# Patient Record
Sex: MALE | Race: WHITE | NOT HISPANIC OR LATINO | Employment: OTHER | URBAN - METROPOLITAN AREA
[De-identification: names, ages, dates, MRNs, and addresses within clinical notes are randomized per-mention and may not be internally consistent; named-entity substitution may affect disease eponyms.]

---

## 2023-06-11 ENCOUNTER — CLAIMS CREATED FROM THE CLAIM WINDOW (OUTPATIENT)
Dept: URBAN - METROPOLITAN AREA MEDICAL CENTER 19 | Facility: MEDICAL CENTER | Age: 68
End: 2023-06-11
Payer: MEDICARE

## 2023-06-11 DIAGNOSIS — R93.89 ABNORMAL FINDINGS ON DIAGNOSTIC IMAGING OF OTHER SPECIFIED BODY STRUCTURES: ICD-10-CM

## 2023-06-11 DIAGNOSIS — R10.13 ABDOMINAL DISCOMFORT, EPIGASTRIC: ICD-10-CM

## 2023-06-11 DIAGNOSIS — A31.9 MYCOBACTERIAL INFECTION, UNSPECIFIED: ICD-10-CM

## 2023-06-11 DIAGNOSIS — K29.80 DUODENITIS WITHOUT BLEEDING: ICD-10-CM

## 2023-06-11 DIAGNOSIS — Z79.1 ENCNTR LONG-TERM NSAID USE: ICD-10-CM

## 2023-06-11 DIAGNOSIS — R11.0 AM NAUSEA: ICD-10-CM

## 2023-06-11 DIAGNOSIS — R93.3 ABN FINDINGS-GI TRACT: ICD-10-CM

## 2023-06-11 PROCEDURE — 99222 1ST HOSP IP/OBS MODERATE 55: CPT | Performed by: STUDENT IN AN ORGANIZED HEALTH CARE EDUCATION/TRAINING PROGRAM

## 2023-07-13 ENCOUNTER — DASHBOARD ENCOUNTERS (OUTPATIENT)
Age: 68
End: 2023-07-13

## 2023-07-13 ENCOUNTER — LAB OUTSIDE AN ENCOUNTER (OUTPATIENT)
Dept: URBAN - METROPOLITAN AREA CLINIC 72 | Facility: CLINIC | Age: 68
End: 2023-07-13

## 2023-07-13 ENCOUNTER — OFFICE VISIT (OUTPATIENT)
Dept: URBAN - METROPOLITAN AREA CLINIC 72 | Facility: CLINIC | Age: 68
End: 2023-07-13
Payer: MEDICARE

## 2023-07-13 VITALS
BODY MASS INDEX: 33.41 KG/M2 | SYSTOLIC BLOOD PRESSURE: 95 MMHG | HEART RATE: 116 BPM | TEMPERATURE: 97.1 F | DIASTOLIC BLOOD PRESSURE: 64 MMHG | WEIGHT: 233.4 LBS | HEIGHT: 70 IN

## 2023-07-13 DIAGNOSIS — R19.7 DIARRHEA, UNSPECIFIED TYPE: ICD-10-CM

## 2023-07-13 DIAGNOSIS — Z87.19 HISTORY OF NSAID-ASSOCIATED GASTROPATHY: ICD-10-CM

## 2023-07-13 DIAGNOSIS — K29.80 DUODENITIS: ICD-10-CM

## 2023-07-13 PROBLEM — 72007001: Status: ACTIVE | Noted: 2023-07-13

## 2023-07-13 PROCEDURE — 99214 OFFICE O/P EST MOD 30 MIN: CPT | Performed by: NURSE PRACTITIONER

## 2023-07-13 RX ORDER — LISINOPRIL 10 MG/1
1 TABLET TABLET ORAL ONCE A DAY
Status: ACTIVE | COMMUNITY

## 2023-07-13 RX ORDER — PROMETHAZINE HYDROCHLORIDE 25 MG/ML
AS DIRECTED INJECTION INTRAMUSCULAR; INTRAVENOUS
Status: ACTIVE | COMMUNITY

## 2023-07-13 RX ORDER — OXYCODONE HYDROCHLORIDE AND ACETAMINOPHEN 5; 325 MG/1; MG/1
1 TABLET AS NEEDED TABLET ORAL
Status: ACTIVE | COMMUNITY

## 2023-07-13 RX ORDER — SULFAMETHOXAZOLE AND TRIMETHOPRIM 400; 80 MG/1; MG/1
1 TABLET TABLET ORAL ONCE A DAY
Status: ACTIVE | COMMUNITY

## 2023-07-13 RX ORDER — SUCRALFATE 1 G/1
1 TABLET ON AN EMPTY STOMACH TABLET ORAL TWICE A DAY
Status: ACTIVE | COMMUNITY

## 2023-07-13 RX ORDER — CLARITHROMYCIN 500 MG/1
1 TABLET TABLET, FILM COATED ORAL
Status: ACTIVE | COMMUNITY

## 2023-07-13 RX ORDER — PANTOPRAZOLE SODIUM 40 MG/1
1 TABLET TABLET, DELAYED RELEASE ORAL ONCE A DAY
Status: ACTIVE | COMMUNITY

## 2023-07-13 NOTE — HPI-TODAY'S VISIT:
67-year-old male here for a hospital follow-up and referral by Dr. Stephenson.  He was seen inpatient by Dr. Kaiser for abnormal CT scan with suspected duodenitis with possible duodenal ulcer likely secondary to NSAID use.  He improved with acid suppression and his diarrhea resolved which is felt secondary to antibiotics.  It was recommended to continue PPI therapy 40 mg of pantoprazole twice a day for 6 weeks and okay to discontinue Carafate.  Recommend he follow-up with GI outpatient for consideration of endoscopy.  Antibiotic recommendation per infectious disease.  Past medical history of rheumatoid arthritis and psoriasis.  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  He admitted to significant NSAID use.  Had CT scan at outside hospital that revealed thickening around first and second portion of duodenum with concern for duodenal ulcer started on Protonix and Carafate.  Labs 6/11/2023.  CBC: Hgb 11, HCT 33.6 with normal platelet and MCV.  CMP 6/10/2023.  Sodium 130 otherwise normal.  Sodium was stable on 6/11/2023.  Seen by PCP 7/10/2023 Dr. Stephenson.  Protonix and Carafate was refilled.  He was unable to get blood pressure while in clinic and was recommended to go to the emergency department.  Is going to be transferring his care to Dr. Duffy.  CTA chest with contrast 6/9/2023.  No PE.  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  Dilated ascending aorta measuring 4.3 cm not significantly changed.  Findings indicate mild to moderate pulmonary fibrosis. CT abdomen and pelvis with contrast 6/9/2023.  Fatty liver 1.4 cm simple appearing cyst in right hepatic lobe.  Stomach is collapsed and semidistended precluding detailed evaluation.  Thickened first and second portions of duodenum with adjacent fat stranding indicating duodenitis.  Duodenal ulcer suspected.  No perforation.  Colon is collapsed/semidistended precluding detailed evaluation.  Colonic diverticulosis without diverticulitis.  Small amount of free pelvic fluid.  Severe atherosclerotic changes present.  On interview today he is doing ok.  Has some nausea and upper abdominal pain at times.  No GERD or vomiting. He is still on the pantoprazole twice a day. No melena or hematochezia.  Diarrhea is controlled.  No BM yesterday. Reports the 3 antibiotics control his mycobacterium but then he gets profuse diarrhea.  When he gets the diarrhea he gets dehydrated pretty quickly and ends up having to go to the ER for antibiotics.  When he goes down to 2 antibiotics the diarrhea is resolved and he is currently only on 2.  He is on Florastor two twice a day. Had some surgery to his right arm to remove the mycobaterium at [facility] around 6/19.  He sees ID Dr. Galicia with [facility].    Last colonoscopy was more than 5 years ago (prior to him moving to [place]). Was told to repeat colon in 10 years.

## 2023-07-17 ENCOUNTER — TELEPHONE ENCOUNTER (OUTPATIENT)
Dept: URBAN - METROPOLITAN AREA CLINIC 113 | Facility: CLINIC | Age: 68
End: 2023-07-17

## 2023-08-09 ENCOUNTER — OFFICE VISIT (OUTPATIENT)
Dept: URBAN - METROPOLITAN AREA MEDICAL CENTER 2 | Facility: MEDICAL CENTER | Age: 68
End: 2023-08-09

## 2023-08-09 ENCOUNTER — OFFICE VISIT (OUTPATIENT)
Dept: URBAN - METROPOLITAN AREA SURGERY CENTER 25 | Facility: SURGERY CENTER | Age: 68
End: 2023-08-09

## 2023-08-10 ENCOUNTER — OFFICE VISIT (OUTPATIENT)
Dept: URBAN - METROPOLITAN AREA MEDICAL CENTER 2 | Facility: MEDICAL CENTER | Age: 68
End: 2023-08-10
Payer: MEDICARE

## 2023-08-10 DIAGNOSIS — R93.3 ABN FINDINGS-GI TRACT: ICD-10-CM

## 2023-08-10 PROCEDURE — 43239 EGD BIOPSY SINGLE/MULTIPLE: CPT | Performed by: STUDENT IN AN ORGANIZED HEALTH CARE EDUCATION/TRAINING PROGRAM
